# Patient Record
Sex: MALE | Race: BLACK OR AFRICAN AMERICAN | Employment: FULL TIME | URBAN - METROPOLITAN AREA
[De-identification: names, ages, dates, MRNs, and addresses within clinical notes are randomized per-mention and may not be internally consistent; named-entity substitution may affect disease eponyms.]

---

## 2017-09-27 ENCOUNTER — HOSPITAL ENCOUNTER (EMERGENCY)
Age: 47
Discharge: HOME | End: 2017-09-27
Attending: EMERGENCY MEDICINE

## 2017-09-27 VITALS
OXYGEN SATURATION: 99 % | SYSTOLIC BLOOD PRESSURE: 150 MMHG | DIASTOLIC BLOOD PRESSURE: 69 MMHG | HEART RATE: 74 BPM | WEIGHT: 201.4 LBS | BODY MASS INDEX: 28.19 KG/M2 | HEIGHT: 71 IN | RESPIRATION RATE: 16 BRPM | TEMPERATURE: 98.7 F

## 2017-09-27 DIAGNOSIS — Z20.2 POSSIBLE EXPOSURE TO STD: Primary | ICD-10-CM

## 2017-09-27 LAB
-: ABNORMAL
AMORPHOUS: ABNORMAL
BACTERIA: ABNORMAL
BILIRUBIN URINE: NEGATIVE
CASTS UA: ABNORMAL /LPF
COLOR: YELLOW
COMMENT UA: ABNORMAL
CRYSTALS, UA: ABNORMAL /HPF
EPITHELIAL CELLS UA: ABNORMAL /HPF
GLUCOSE URINE: NEGATIVE
KETONES, URINE: NEGATIVE
LEUKOCYTE ESTERASE, URINE: NEGATIVE
MUCUS: ABNORMAL
NITRITE, URINE: NEGATIVE
OTHER OBSERVATIONS UA: ABNORMAL
PH UA: 5.5 (ref 5–8)
PROTEIN UA: NEGATIVE
RBC UA: ABNORMAL /HPF (ref 0–2)
RENAL EPITHELIAL, UA: ABNORMAL /HPF
SPECIFIC GRAVITY UA: 1.02 (ref 1–1.03)
TRICHOMONAS: ABNORMAL
TURBIDITY: CLEAR
URINE HGB: ABNORMAL
UROBILINOGEN, URINE: NORMAL
WBC UA: ABNORMAL /HPF (ref 0–5)
YEAST: ABNORMAL

## 2017-09-27 PROCEDURE — 87591 N.GONORRHOEAE DNA AMP PROB: CPT

## 2017-09-27 PROCEDURE — 81001 URINALYSIS AUTO W/SCOPE: CPT

## 2017-09-27 PROCEDURE — 87491 CHLMYD TRACH DNA AMP PROBE: CPT

## 2017-09-27 PROCEDURE — 99283 EMERGENCY DEPT VISIT LOW MDM: CPT

## 2017-09-27 RX ORDER — METRONIDAZOLE 500 MG/1
500 TABLET ORAL 2 TIMES DAILY
Qty: 14 TABLET | Refills: 0 | Status: SHIPPED | OUTPATIENT
Start: 2017-09-27 | End: 2017-10-04

## 2017-09-27 ASSESSMENT — ENCOUNTER SYMPTOMS: ABDOMINAL PAIN: 0

## 2017-09-28 LAB
C. TRACHOMATIS DNA ,URINE: NEGATIVE
N. GONORRHOEAE DNA, URINE: NEGATIVE

## 2022-10-13 ENCOUNTER — HOSPITAL ENCOUNTER (EMERGENCY)
Age: 52
Discharge: HOME | End: 2022-10-13
Attending: EMERGENCY MEDICINE

## 2022-10-13 VITALS
OXYGEN SATURATION: 94 % | BODY MASS INDEX: 28 KG/M2 | TEMPERATURE: 97.7 F | HEIGHT: 71 IN | WEIGHT: 200 LBS | RESPIRATION RATE: 14 BRPM | SYSTOLIC BLOOD PRESSURE: 158 MMHG | HEART RATE: 74 BPM | DIASTOLIC BLOOD PRESSURE: 95 MMHG

## 2022-10-13 DIAGNOSIS — Z71.1 CONCERN ABOUT STD IN MALE WITHOUT DIAGNOSIS: Primary | ICD-10-CM

## 2022-10-13 PROCEDURE — 99283 EMERGENCY DEPT VISIT LOW MDM: CPT

## 2022-10-13 PROCEDURE — 87591 N.GONORRHOEAE DNA AMP PROB: CPT

## 2022-10-13 PROCEDURE — 87491 CHLMYD TRACH DNA AMP PROBE: CPT

## 2022-10-13 ASSESSMENT — ENCOUNTER SYMPTOMS
ABDOMINAL PAIN: 0
SHORTNESS OF BREATH: 0
COLOR CHANGE: 0

## 2022-10-13 ASSESSMENT — PAIN - FUNCTIONAL ASSESSMENT: PAIN_FUNCTIONAL_ASSESSMENT: NONE - DENIES PAIN

## 2022-10-14 LAB
C. TRACHOMATIS DNA ,URINE: NEGATIVE
N. GONORRHOEAE DNA, URINE: NEGATIVE
SPECIMEN DESCRIPTION: NORMAL

## 2022-10-14 NOTE — ED PROVIDER NOTES
eMERGENCY dEPARTMENT eNCOUnter   [address] [place] Name: Charlotte Fox  MRN: [id number]  [place] 1970  Date of evaluation: 10/13/22     Charlotte Fox is a 45 y.o. male with CC: Exposure to STD        This visit was performed by both a physician and an APC. I performed all aspects of the MDM as documented. The care is provided during an unprecedented national emergency due to the novel coronavirus, COVID 19.     Lul Reynolds MD  Attending Emergency Physician          Selma Guerrero MD  10/13/22 [id number]

## 2022-10-14 NOTE — ED PROVIDER NOTES
Team [address] ED  eMERGENCY dEPARTMENT eNCOUnter      Pt Name: Selam Buchanan  MRN: [id number]  [place] 1970  Date of evaluation: 10/13/2022  Provider: ASA Mendoza [id number]       Chief Complaint   Patient presents with    Exposure to STD         HISTORY OF PRESENT ILLNESS  (Location/Symptom, Timing/Onset, Context/Setting, Quality, Duration, Modifying Factors, Severity.)   Selam Buchanan is a 45 y.o. male who presents to the emergency department. Pt states he recently started dating an old partner. He states she had an STD check and he was told he needed to get checked as well. He states she tested negative. He denies sx. He denies penile discharge, erythema, lesions. Denies abd pain, dysuria. Denies pain. Nursing Notes were reviewed. ALLERGIES     Patient has no known allergies. CURRENT MEDICATIONS       Discharge Medication List as of 10/13/2022  9:35 PM        CONTINUE these medications which have NOT CHANGED    Details   ibuprofen (ADVIL;MOTRIN) 600 MG tablet Take 1 tablet by mouth every 6 hours as needed for Pain., Disp-30 tablet, R-0             PAST MEDICAL HISTORY   No past medical history on file. SURGICAL HISTORY     No past surgical history on file. FAMILY HISTORY     No family history on file. No family status information on file. SOCIAL HISTORY      reports that he has been smoking cigarettes. He does not have any smokeless tobacco history on file. He reports current alcohol use. He reports that he does not use drugs. REVIEW OF SYSTEMS    (2-9 systems for level 4, 10 or more for level 5)     Review of Systems   Constitutional:  Negative for chills, diaphoresis, fatigue and fever. Respiratory:  Negative for shortness of breath. Cardiovascular:  Negative for chest pain. Gastrointestinal:  Negative for abdominal pain.    Genitourinary:  Negative for decreased urine volume, difficulty urinating, dysuria, flank pain, frequency, genital sores, hematuria, penile discharge, penile pain, penile swelling, scrotal swelling, testicular pain and urgency. Skin:  Negative for color change, rash and wound. Neurological:  Negative for weakness. Except as noted above the remainder of the review of systems was reviewed and negative. PHYSICAL EXAM    (up to 7 for level 4, 8 or more for level 5)     ED Triage Vitals [10/13/22 2051]   BP Temp Temp Source Heart Rate Resp SpO2 Height Weight   (!) 158/95 97.7 °F (36.5 °C) Axillary 74 14 94 % 5' 11\" (1.803 m) 200 lb (90.7 kg)     Physical Exam  Vitals reviewed. Constitutional:       General: He is not in acute distress. Appearance: He is well-developed. He is not diaphoretic. Eyes:      Conjunctiva/sclera: Conjunctivae normal.   Cardiovascular:      Rate and Rhythm: Normal rate. Pulmonary:      Effort: Pulmonary effort is normal. No respiratory distress. Breath sounds: No stridor. Genitourinary:     Comments: Deferred as patient stated he was asymptomatic. He denied penile discharge, lesions, erythema, swelling. Musculoskeletal:      Cervical back: Neck supple. Skin:     General: Skin is warm and dry. Findings: No rash. Neurological:      Mental Status: He is alert and oriented to person, place, and time. Psychiatric:         Behavior: Behavior normal.        DIAGNOSTIC RESULTS     LABS:  Labs Reviewed   C.TRACHOMATIS N.GONORRHOEAE DNA, URINE       All other labs were within normal range or not returned as of this dictation. EMERGENCY DEPARTMENT COURSE and DIFFERENTIAL DIAGNOSIS/MDM:   Vitals:    Vitals:    10/13/22 2051   BP: (!) 158/95   Pulse: 74   Resp: 14   Temp: 97.7 °F (36.5 °C)   TempSrc: Axillary   SpO2: 94%   Weight: 200 lb (90.7 kg)   Height: 5' 11\" (1.803 m)       CLINICAL DECISION MAKING:  The patient presented alert with a nontoxic appearance and was seen in conjunction with Dr. Maggie Mclain. Uriprobes are pending.  Follow up with the health department for a recheck, further evaluation and treatment. Evaluation and treatment course in the ED, and plan of care upon discharge was discussed in length with the patient. Patient had no further questions prior to being discharged and was instructed to return to the ED for new or worsening symptoms. Care was provided during an unprecedented national emergency due to the novel coronavirus, Covid-19. FINAL IMPRESSION      1. Concern about STD in male without diagnosis        DISPOSITION/PLAN   DISPOSITION Decision To Discharge 10/13/2022 08:59:55 PM      PATIENT REFERRED TO:   [address] [place] [id number]  [phone]  Schedule an appointment as soon as possible for a visit       DISCHARGE MEDICATIONS:     Discharge Medication List as of 10/13/2022  9:35 PM              (Please note that portions of this note were completed with a voice recognition program.  Efforts were made to edit the dictations but occasionally words are mis-transcribed.)    ASA Elaine - CNP      ASA Elaine - CNP  10/13/22 2754

## 2022-10-14 NOTE — DISCHARGE INSTR - COC
Continuity of Care Form    Patient Name: Kirstie Rosado   :  1970  MRN:  [id number]    Admit date:  10/13/2022  Discharge date:  ***    Code Status Order: No Order   Advance Directives:     Admitting Physician:  No admitting provider for patient encounter. PCP: No primary care provider on file. Discharging Nurse: [facility] Unit/Room#: STA16/16  Discharging Unit Phone Number: ***    Emergency Contact:   Extended Emergency Contact Information  Primary Emergency Contact: Nathalia Landa  Address: [place]  Home Phone: [phone]  Relation: Brother/Sister    Past Surgical History:  No past surgical history on file. Immunization History: There is no immunization history on file for this patient. Active Problems: There is no problem list on file for this patient. Isolation/Infection:   Isolation            No Isolation          Patient Infection Status       None to display            Nurse Assessment:  Last Vital Signs: BP (!) 158/95   Pulse 74   Temp 97.7 °F (36.5 °C) (Axillary)   Resp 14   Ht 5' 11\" (1.803 m)   Wt 200 lb (90.7 kg)   SpO2 94%   BMI 27.89 kg/m²     Last documented pain score (0-10 scale):    Last Weight:   Wt Readings from Last 1 Encounters:   10/13/22 200 lb (90.7 kg)     Mental Status:  {IP PT MENTAL STATUS:}    IV Access:  { WILLIAM IV ACCESS:258067644}    Nursing Mobility/ADLs:  Walking   {CHP DME MPZC:149372991}  Transfer  {CHP DME XIMD:177363403}  Bathing  {CHP DME VGLD:477139380}  Dressing  {CHP DME CHGI:459970772}  Toileting  {CHP DME YPER:993520067}  Feeding  {CHP DME YDLT:902942612}  Med Admin  {P DME LNMN:067287039}  Med Delivery   { WILLIAM MED Delivery:003092319}    Wound Care Documentation and Therapy:        Elimination:  Continence:    Bowel: {YES / GX:84314}  Bladder: {YES / XJ:01549}  Urinary Catheter: {Urinary Catheter:422874831}   Colostomy/Ileostomy/Ileal Conduit: {YES / L}       Date of Last BM: ***  No intake or output data in the 24 hours ending 10/13/22 2059  No intake/output data recorded.     Safety Concerns:     [address] TH[address] Safety Concerns:995623182}    Impairments/Disabilities:      [address] THOMAS Impairments/Disabilities:660518458}    Nutrition Therapy:  Current Nutrition Therapy:   [address] THOMAS Diet List:526260281}    Routes of Feeding: {CHP DME Other Feedings:837270311}  Liquids: {Slp liquid thickness:49922}  Daily Fluid Restriction: {CHP DME Yes amt example:058605682}  Last Modified Barium Swallow with Video (Video Swallowing Test): {Done Not Done WRGX:522292555}    Treatments at the Time of Hospital Discharge:   Respiratory Treatments: ***  Oxygen Therapy:  {Therapy; copd oxygen:04883}  Ventilator:    { CC Vent UYZH:821881990}    Rehab Therapies: {THERAPEUTIC INTERVENTION:1556043556}  Weight Bearing Status/Restrictions: Leyla Herrmann  Weight Bearin}  Other Medical Equipment (for information only, NOT a DME order):  {EQUIPMENT:267577020}  Other Treatments: ***    Patient's personal belongings (please select all that are sent with patient):  {CHP DME Belongings:852722570}    RN SIGNATURE:  {Esignature:950282818}    CASE MANAGEMENT/SOCIAL WORK SECTION    Inpatient Status Date: ***    Readmission Risk Assessment Score:  Readmission Risk              Risk of Unplanned Readmission:  0           Discharging to Facility/ Agency   Name:   Address:  Phone:  Fax:    Dialysis Facility (if applicable)   Name:  Address:  Dialysis Schedule:  Phone:  Fax:    / signature: {Esignature:157557867}    PHYSICIAN SECTION    Prognosis: {Prognosis:1605400248}    Condition at Discharge: 50Deepika Herrmann Patient Condition:296832702}    Rehab Potential (if transferring to Rehab): {Prognosis:2552000605}    Recommended Labs or Other Treatments After Discharge: ***    Physician Certification: I certify the above information and transfer of Yadira Justice  is necessary for the continuing treatment of the diagnosis listed and that he requires {Admit to Appropriate Level of Care:37069} for {GREATER/LESS:733309295} 30 days.      Update Admission H&P: {CHP DME Changes in YJKHB:557630937}    PHYSICIAN SIGNATURE:  {Esignature:547377328}

## 2024-08-31 ENCOUNTER — HOSPITAL ENCOUNTER (EMERGENCY)
Age: 54
Discharge: HOME | End: 2024-08-31
Attending: EMERGENCY MEDICINE
Payer: OTHER MISCELLANEOUS

## 2024-08-31 ENCOUNTER — APPOINTMENT (OUTPATIENT)
Dept: GENERAL RADIOLOGY | Age: 54
End: 2024-08-31
Payer: OTHER MISCELLANEOUS

## 2024-08-31 VITALS
BODY MASS INDEX: 24.41 KG/M2 | SYSTOLIC BLOOD PRESSURE: 144 MMHG | RESPIRATION RATE: 18 BRPM | WEIGHT: 175 LBS | DIASTOLIC BLOOD PRESSURE: 84 MMHG | HEART RATE: 88 BPM | TEMPERATURE: 98.8 F | OXYGEN SATURATION: 97 %
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
## 2024-08-31 DIAGNOSIS — S80.12XA CONTUSION OF LEFT LOWER EXTREMITY, INITIAL ENCOUNTER: ICD-10-CM

## 2024-08-31 DIAGNOSIS — S80.812A ABRASION OF LEFT LOWER EXTREMITY, INITIAL ENCOUNTER: ICD-10-CM

## 2024-08-31 PROCEDURE — 90471 IMMUNIZATION ADMIN: CPT | Performed by: EMERGENCY MEDICINE

## 2024-08-31 PROCEDURE — 90715 TDAP VACCINE 7 YRS/> IM: CPT | Performed by: EMERGENCY MEDICINE

## 2024-08-31 PROCEDURE — 73590 X-RAY EXAM OF LOWER LEG: CPT

## 2024-08-31 PROCEDURE — 99283 EMERGENCY DEPT VISIT LOW MDM: CPT

## 2024-08-31 PROCEDURE — 6370000000 HC RX 637 (ALT 250 FOR IP): Performed by: EMERGENCY MEDICINE

## 2024-08-31 PROCEDURE — 6360000002 HC RX W HCPCS: Performed by: EMERGENCY MEDICINE

## 2024-08-31 RX ORDER — ACETAMINOPHEN 500 MG
1000 TABLET ORAL ONCE
Status: COMPLETED | OUTPATIENT
Start: 2024-08-31 | End: 2024-08-31

## 2024-08-31 RX ADMIN — ACETAMINOPHEN 1000 MG: 500 TABLET ORAL at 01:44

## 2024-08-31 RX ADMIN — TETANUS TOXOID, REDUCED DIPHTHERIA TOXOID AND ACELLULAR PERTUSSIS VACCINE, ADSORBED 0.5 ML: 5; 2.5; 8; 8; 2.5 SUSPENSION INTRAMUSCULAR at 01:38

## 2024-08-31 ASSESSMENT — PAIN DESCRIPTION - LOCATION
LOCATION: LEG
LOCATION: LEG

## 2024-08-31 ASSESSMENT — PAIN DESCRIPTION - ORIENTATION
ORIENTATION: LEFT
ORIENTATION: LEFT

## 2024-08-31 ASSESSMENT — PAIN SCALES - GENERAL
PAINLEVEL_OUTOF10: 4
PAINLEVEL_OUTOF10: 1

## 2024-08-31 ASSESSMENT — PAIN - FUNCTIONAL ASSESSMENT: PAIN_FUNCTIONAL_ASSESSMENT: 0-10
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
## 2024-08-31 NOTE — ED NOTES
[employer] and [place] police at bedside with pt to search pts pockets prior to going to bathroom.   Pt went to bathroom per self with no difficulties. When coming out of bathroom pt stated \"man you don't gotta worry about anything else. I got nothing else on me man, you all can cool down.\"   [employer] and [place] police followed pt back to room.
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
## 2024-09-27 ENCOUNTER — HOSPITAL ENCOUNTER (EMERGENCY)
Age: 54
Discharge: HOME | End: 2024-09-27
Attending: EMERGENCY MEDICINE

## 2024-09-27 VITALS
BODY MASS INDEX: 26.5 KG/M2 | OXYGEN SATURATION: 98 % | DIASTOLIC BLOOD PRESSURE: 96 MMHG | TEMPERATURE: 98.3 F | HEART RATE: 61 BPM | WEIGHT: 190 LBS | SYSTOLIC BLOOD PRESSURE: 177 MMHG | RESPIRATION RATE: 15 BRPM

## 2024-09-27 DIAGNOSIS — S81.802A WOUND OF LEFT LOWER EXTREMITY, INITIAL ENCOUNTER: Primary | ICD-10-CM

## 2024-09-27 PROCEDURE — 99282 EMERGENCY DEPT VISIT SF MDM: CPT

## 2024-09-27 NOTE — DISCHARGE INSTRUCTIONS
Please apply a wet-to-dry dressing twice a day.  Keep the wound covered at all times.  We have provided you with contact information for the [facility] wound care clinic.  We recommend making an appointment for the next 1 to 2 weeks.

## 2024-09-27 NOTE — ED NOTES
Pt provided education on wet to dry dressings with all supplies provided to go home with including 2x2 gauze, 0.9% normal saline, betadine, sterile q-tips, non-adherent dressings, paper tape, and large gloves. Patient verbalized understanding to complete two clean wet to dry dressing changes daily as well as to follow up with wound care at [facility]. Patient verbalized understanding.
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
## 2024-10-01 NOTE — ED PROVIDER NOTES
wound does not look infected.  It is deep and gaping.  Given the duration that the wound has been present and despite the fact that it did originate from a traumatic injury, would not recommend suturing at this time.  Will recommend wet-to-dry dressings changed twice a day.  Patient was given instructions on how to perform this.  Will discharge with contact remission for our wound care clinic at [facility].    1)  Number and Complexity of Problems  Problem List This Visit: Leg wound     Differential Diagnosis: Skin ulcer, infected ulcer     Pertinent Comorbid Conditions: None     2)  Data Reviewed    Decision Rules/Scores utilized:  N/A    External Documents Reviewed: I have independently reviewed patient's previous medical records including labs, notes and imaging.     Tests considered but not ordered and why:  blood work and imaging not indicated     Imaging that is independently reviewed and interpreted by me as:  none     See more data below for the lab and radiology tests and orders.     3)  Treatment and Disposition     Disposition discussion with patient/family: Patient aware and agrees with disposition plan.     Case discussed with consulting clinician:  n/a     MIPS:  n/a     Social determinants of health impacting treatment or disposition:  None    Code Status Discussion:  Not discussed    CONSULTS:  None    PROCEDURES:  None indicated     FINAL IMPRESSION     1. Wound of left lower extremity, initial encounter          DISPOSITION/PLAN   DISPOSITION Decision To Discharge 09/27/2024 03:33:02 AM  Condition at Disposition: Data Unavailable    PATIENT REFERRED TO:   No follow-up provider specified.  DISCHARGE MEDICATIONS:     Discharge Medication List as of 9/27/2024  3:34 AM        (Please note that portions of this note were completed with a voice recognition program.  Efforts were made to edit the dictations but occasionally words are mis-transcribed.)    Arnaldo Gresham MD  Attending Emergency